# Patient Record
Sex: MALE | Race: ASIAN | NOT HISPANIC OR LATINO | URBAN - METROPOLITAN AREA
[De-identification: names, ages, dates, MRNs, and addresses within clinical notes are randomized per-mention and may not be internally consistent; named-entity substitution may affect disease eponyms.]

---

## 2019-03-11 ENCOUNTER — EMERGENCY (EMERGENCY)
Facility: HOSPITAL | Age: 29
LOS: 1 days | Discharge: HOME | End: 2019-03-11
Admitting: EMERGENCY MEDICINE
Payer: COMMERCIAL

## 2019-03-11 VITALS
SYSTOLIC BLOOD PRESSURE: 153 MMHG | TEMPERATURE: 99 F | RESPIRATION RATE: 17 BRPM | OXYGEN SATURATION: 98 % | DIASTOLIC BLOOD PRESSURE: 101 MMHG

## 2019-03-11 PROCEDURE — 73610 X-RAY EXAM OF ANKLE: CPT | Mod: 26,RT

## 2019-03-11 PROCEDURE — 99283 EMERGENCY DEPT VISIT LOW MDM: CPT | Mod: 25

## 2019-03-11 PROCEDURE — 73630 X-RAY EXAM OF FOOT: CPT | Mod: 26,RT

## 2019-03-11 RX ORDER — IBUPROFEN 200 MG
600 TABLET ORAL ONCE
Qty: 0 | Refills: 0 | Status: COMPLETED | OUTPATIENT
Start: 2019-03-11 | End: 2019-03-11

## 2019-03-11 RX ADMIN — Medication 600 MILLIGRAM(S): at 23:26

## 2019-03-11 NOTE — ED PROVIDER NOTE - OBJECTIVE STATEMENT
29 yo M here for R ankle pain x today. reports was playing basketball and twisted ankle. felt pain after, mom wrapped in ACE wrap with some improvement however still hurts to ambulate. no meds. denies fever chills numbness tingling cp sob weakness HA dizziness. denies other injury or trauma.

## 2019-03-11 NOTE — ED PROVIDER NOTE - NSFOLLOWUPINSTRUCTIONS_ED_ALL_ED_FT
Take motrin 600mg every 6 hours for pain   Rest ice elevate  Use aircast as needed  Follow up with your PMD

## 2019-03-11 NOTE — ED PROVIDER NOTE - PHYSICAL EXAMINATION
R ankle: FROM, NVI, sensate intact, no ecchymosis, no erythema, +ttp to lateral malleolous, no deformity. no foot tenderness or swelling.

## 2023-01-01 NOTE — ED PROVIDER NOTE - CONDITION AT DISCHARGE:
Improved
Screen#: [id number]  Screen Date: 2023  Screen Comment: N/A    Screen#: [id number]  Screen Date: 2023  Screen Comment: [facility] completed and passed 4/16/23. right hand 98% right foot 99%